# Patient Record
Sex: FEMALE | Race: WHITE | NOT HISPANIC OR LATINO | Employment: OTHER | URBAN - METROPOLITAN AREA
[De-identification: names, ages, dates, MRNs, and addresses within clinical notes are randomized per-mention and may not be internally consistent; named-entity substitution may affect disease eponyms.]

---

## 2018-06-12 PROBLEM — J44.9 CHRONIC OBSTRUCTIVE PULMONARY DISEASE (HCC): Status: ACTIVE | Noted: 2018-06-12

## 2018-06-12 PROBLEM — N28.1 RENAL CYST, LEFT: Status: ACTIVE | Noted: 2018-06-12

## 2018-06-12 PROBLEM — Z79.890 HORMONE REPLACEMENT THERAPY (HRT): Status: ACTIVE | Noted: 2018-06-12

## 2018-11-09 PROBLEM — N39.0 URINARY TRACT INFECTION WITHOUT HEMATURIA: Status: ACTIVE | Noted: 2018-11-09

## 2018-11-09 PROBLEM — M54.50 LOW BACK PAIN: Status: ACTIVE | Noted: 2018-11-09

## 2018-11-09 PROBLEM — R30.0 DYSURIA: Status: ACTIVE | Noted: 2018-11-09

## 2018-11-09 PROBLEM — R35.89 POLYURIA: Status: ACTIVE | Noted: 2018-11-09

## 2018-11-09 PROBLEM — R50.9 FEVER: Status: ACTIVE | Noted: 2018-11-09

## 2020-06-23 PROBLEM — I70.213 INTERMITTENT CLAUDICATION OF BOTH LOWER EXTREMITIES DUE TO ATHEROSCLEROSIS (HCC): Status: ACTIVE | Noted: 2020-06-23

## 2020-06-23 PROBLEM — I65.23 OCCLUSION AND STENOSIS OF BILATERAL CAROTID ARTERIES: Status: ACTIVE | Noted: 2020-06-23

## 2020-06-23 PROBLEM — Z95.828 PRESENCE OF OTHER VASCULAR IMPLANTS AND GRAFTS: Status: ACTIVE | Noted: 2020-06-23

## 2020-06-23 PROBLEM — I77.1 STRICTURE OF ARTERY (HCC): Status: ACTIVE | Noted: 2020-06-23

## 2021-05-10 PROBLEM — I71.40 ABDOMINAL AORTIC ANEURYSM WITHOUT RUPTURE (HCC): Status: ACTIVE | Noted: 2021-05-10

## 2021-05-10 PROBLEM — R79.89 ELEVATED BRAIN NATRIURETIC PEPTIDE (BNP) LEVEL: Status: ACTIVE | Noted: 2021-05-10

## 2021-05-10 PROBLEM — I70.203 UNSPECIFIED ATHEROSCLEROSIS OF NATIVE ARTERIES OF EXTREMITIES, BILATERAL LEGS (HCC): Status: ACTIVE | Noted: 2021-05-10

## 2021-05-10 PROBLEM — R06.02 SOB (SHORTNESS OF BREATH): Status: ACTIVE | Noted: 2021-05-10

## 2021-05-10 PROBLEM — R01.1 HEART MURMUR: Status: ACTIVE | Noted: 2021-05-10

## 2021-12-10 ENCOUNTER — TELEPHONE (OUTPATIENT)
Dept: HEALTH INFORMATION MANAGEMENT | Facility: OTHER | Age: 76
End: 2021-12-10

## 2021-12-10 NOTE — TELEPHONE ENCOUNTER
Pt returned call regarding CV AWV. Pt complete AWV for Medicare on 9/28/21. Advise patient that she is not due for an AWV at this time.

## 2023-12-27 PROBLEM — I50.41 ACUTE COMBINED SYSTOLIC AND DIASTOLIC CONGESTIVE HEART FAILURE (HCC): Status: ACTIVE | Noted: 2023-12-27

## 2023-12-27 PROBLEM — J96.20 ACUTE ON CHRONIC RESPIRATORY FAILURE (HCC): Status: ACTIVE | Noted: 2023-12-27

## 2023-12-27 PROBLEM — I48.91 ATRIAL FIBRILLATION WITH RVR (HCC): Status: ACTIVE | Noted: 2023-12-27

## 2023-12-27 PROBLEM — I50.9 ACUTE CHF (CONGESTIVE HEART FAILURE) (HCC): Status: ACTIVE | Noted: 2023-12-27

## 2023-12-28 PROBLEM — J90 PLEURAL EFFUSION: Status: ACTIVE | Noted: 2023-12-28

## 2023-12-29 PROBLEM — N30.01 ACUTE CYSTITIS WITH HEMATURIA: Status: ACTIVE | Noted: 2023-12-29
